# Patient Record
Sex: MALE | Race: BLACK OR AFRICAN AMERICAN | NOT HISPANIC OR LATINO | URBAN - METROPOLITAN AREA
[De-identification: names, ages, dates, MRNs, and addresses within clinical notes are randomized per-mention and may not be internally consistent; named-entity substitution may affect disease eponyms.]

---

## 2023-03-28 ENCOUNTER — HOSPITAL ENCOUNTER (EMERGENCY)
Facility: OTHER | Age: 23
Discharge: HOME | End: 2023-03-28
Attending: EMERGENCY MEDICINE
Payer: MEDICAID

## 2023-03-28 VITALS
HEART RATE: 62 BPM | RESPIRATION RATE: 16 BRPM | OXYGEN SATURATION: 100 % | SYSTOLIC BLOOD PRESSURE: 128 MMHG | DIASTOLIC BLOOD PRESSURE: 68 MMHG | TEMPERATURE: 99 F

## 2023-03-28 DIAGNOSIS — J02.9 VIRAL PHARYNGITIS: Primary | ICD-10-CM

## 2023-03-28 DIAGNOSIS — H10.31 ACUTE CONJUNCTIVITIS OF RIGHT EYE, UNSPECIFIED ACUTE CONJUNCTIVITIS TYPE: ICD-10-CM

## 2023-03-28 LAB — GROUP A STREP, MOLECULAR: NEGATIVE

## 2023-03-28 PROCEDURE — 87651 STREP A DNA AMP PROBE: CPT | Performed by: EMERGENCY MEDICINE

## 2023-03-28 PROCEDURE — 25000003 PHARM REV CODE 250: Performed by: EMERGENCY MEDICINE

## 2023-03-28 PROCEDURE — 99284 EMERGENCY DEPT VISIT MOD MDM: CPT

## 2023-03-28 RX ORDER — PROPARACAINE HYDROCHLORIDE 5 MG/ML
1 SOLUTION/ DROPS OPHTHALMIC
Status: COMPLETED | OUTPATIENT
Start: 2023-03-28 | End: 2023-03-28

## 2023-03-28 RX ORDER — ERYTHROMYCIN 5 MG/G
OINTMENT OPHTHALMIC
Qty: 1 G | Refills: 0 | Status: SHIPPED | OUTPATIENT
Start: 2023-03-28

## 2023-03-28 RX ORDER — IBUPROFEN 600 MG/1
600 TABLET ORAL EVERY 6 HOURS PRN
Qty: 20 TABLET | Refills: 0 | Status: SHIPPED | OUTPATIENT
Start: 2023-03-28

## 2023-03-28 RX ORDER — IBUPROFEN 600 MG/1
600 TABLET ORAL
Status: COMPLETED | OUTPATIENT
Start: 2023-03-28 | End: 2023-03-28

## 2023-03-28 RX ORDER — MAG HYDROX/ALUMINUM HYD/SIMETH 200-200-20
30 SUSPENSION, ORAL (FINAL DOSE FORM) ORAL ONCE
Status: COMPLETED | OUTPATIENT
Start: 2023-03-28 | End: 2023-03-28

## 2023-03-28 RX ORDER — LIDOCAINE HYDROCHLORIDE 20 MG/ML
15 SOLUTION OROPHARYNGEAL ONCE
Status: COMPLETED | OUTPATIENT
Start: 2023-03-28 | End: 2023-03-28

## 2023-03-28 RX ADMIN — PROPARACAINE HYDROCHLORIDE 1 DROP: 5 SOLUTION/ DROPS OPHTHALMIC at 05:03

## 2023-03-28 RX ADMIN — FLUORESCEIN SODIUM 1 EACH: 1 STRIP OPHTHALMIC at 05:03

## 2023-03-28 RX ADMIN — ALUMINUM HYDROXIDE, MAGNESIUM HYDROXIDE, AND SIMETHICONE 30 ML: 200; 200; 20 SUSPENSION ORAL at 05:03

## 2023-03-28 RX ADMIN — IBUPROFEN 600 MG: 600 TABLET, FILM COATED ORAL at 05:03

## 2023-03-28 RX ADMIN — LIDOCAINE HYDROCHLORIDE 15 ML: 20 SOLUTION ORAL; TOPICAL at 05:03

## 2023-03-28 NOTE — ED PROVIDER NOTES
"Encounter Date: 3/28/2023    SCRIBE #1 NOTE: I, Andie Shen, am scribing for, and in the presence of,  Momo Alcaraz MD.     History     Chief Complaint   Patient presents with    Sore Throat    Conjunctivitis     Pt c/o sore throat and "the start of pink eye" x 2 days. CHEL Dennis is a 22 y.o. male, with no pertinent PMHx, who presents to the ED for evaluation of sore throat onset 2-3 days ago. States his pain worsens when eating but denies significant changes in P/O. Endorses associated headache, rhinorrhea, diffuse myalgias, and subjective fever and diaphoresis. He reports right eye redness since earlier today with associated cloudy vision, drainage, and right eye pain. He has not taken any analgesics for his pain. 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 This is the extent of the patient's complaints at this time.    The history is provided by the patient.   Review of patient's allergies indicates:  No Known Allergies  No past medical history on file.  No past surgical history on file.  No family history on file.     Review of Systems   Constitutional:  Positive for diaphoresis and fever.   HENT:  Positive for rhinorrhea and sore throat. Negative for congestion.    Eyes:  Positive for pain, discharge, redness and visual disturbance.   Respiratory:  Negative for shortness of breath.    Cardiovascular:  Negative for chest pain.   Gastrointestinal:  Negative for abdominal pain.   Genitourinary:  Negative for dysuria.   Musculoskeletal:  Positive for myalgias.   Skin:  Negative for rash.   Neurological:  Positive for headaches.   Psychiatric/Behavioral:  Negative for confusion.      Physical Exam     Initial Vitals [03/28/23 0413]   BP Pulse Resp Temp SpO2   126/68 (!) 58 16 98.4 °F (36.9 °C) 100 %      MAP       --         Physical Exam    Nursing note and vitals reviewed.  Constitutional: He appears well-developed and well-nourished. He is not diaphoretic. No " distress.   HENT:   Head: Normocephalic and atraumatic.   Mouth/Throat: No oropharyngeal exudate.   Posterior oropharynx erythema. No peritonsillar abscess.    Eyes: EOM are normal. Pupils are equal, round, and reactive to light. No scleral icterus.   Diffuse right eye conjunctival erythema some crusted discharge.  Fluorescein stain with no areas of uptake   Neck: Neck supple.   Normal range of motion.  Cardiovascular:  Normal rate, regular rhythm, normal heart sounds and intact distal pulses.           No murmur heard.  Pulmonary/Chest: Breath sounds normal. No respiratory distress. He has no wheezes. He has no rhonchi. He has no rales.   Abdominal: Abdomen is soft. There is no abdominal tenderness. There is no rebound and no guarding.   Musculoskeletal:         General: No edema.      Cervical back: Normal range of motion and neck supple.     Lymphadenopathy:     He has no cervical adenopathy.   Neurological: He is alert and oriented to person, place, and time.   Skin: Skin is warm and dry.   Psychiatric: He has a normal mood and affect.       ED Course   Procedures  Labs Reviewed   GROUP A STREP, MOLECULAR          Imaging Results    None          Medications   fluorescein ophthalmic strip 1 each (1 each Both Eyes Given 3/28/23 0513)   proparacaine 0.5 % ophthalmic solution 1 drop (1 drop Both Eyes Given 3/28/23 0513)   aluminum-magnesium hydroxide-simethicone 200-200-20 mg/5 mL suspension 30 mL (30 mLs Oral Given 3/28/23 0512)     And   LIDOcaine HCl 2% oral solution 15 mL (15 mLs Oral Given 3/28/23 0512)   ibuprofen tablet 600 mg (600 mg Oral Given 3/28/23 0512)     Medical Decision Making:   History:   Old Medical Records: I decided to obtain old medical records.  Initial Assessment:       22-year-old male with no comorbidities presents for evaluation of sore throat and right eye redness.  He states onset of sore throat was about 2-3 days ago, reports associated congestion and mild cough as well, and  subjective fevers and myalgias.  No definite known sick contacts.  He has still been able to eat, denies any nausea or diarrhea.  Today with right eye redness that is somewhat painful, also endorses some drainage from it as well, but no blurry vision.  On exam patient with mild posterior pharynx erythema but no exudates or sign of peritonsillar abscess.  No other symptoms or known exposures to suggest mono.  Given other URI symptoms, more likely viral pharyngitis than strep.  He does have diffuse right eye conjunctival erythema, but no purulent drainage.  Fluorescein stain done that shows no uptake, no evidence for corneal abrasion or ulcer.  Strep test negative.  Patient advised on supportive care for suspected viral pharyngitis and viral conjunctivitis, no indication for oral antibiotics at this time, he will continue NSAIDs and topical anesthetics for sore throat, and will give Rx for erythromycin ointment if he has persistent right eye redness or drainage that is more consistent with bacterial conjunctivitis.  He is comfortable with discharge plan and understands return precautions for any worsening symptoms or other concerns      Clinical Tests:   Lab Tests: Ordered and Reviewed        Scribe Attestation:   Scribe #1: I performed the above scribed service and the documentation accurately describes the services I performed. I attest to the accuracy of the note.    I, Dr. Momo Alcaraz, personally performed the services described in this documentation. All medical record entries made by the scribe were at my direction and in my presence.  I have reviewed the chart and agree that the record reflects my personal performance and is accurate and complete. Momo Alcaraz MD.  9:40 PM 03/28/2023                     Clinical Impression:   Final diagnoses:  [J02.9] Viral pharyngitis (Primary)  [H10.31] Acute conjunctivitis of right eye, unspecified acute conjunctivitis type        ED Disposition Condition     Discharge Stable          ED Prescriptions       Medication Sig Dispense Start Date End Date Auth. Provider    ibuprofen (ADVIL,MOTRIN) 600 MG tablet Take 1 tablet (600 mg total) by mouth every 6 (six) hours as needed for Pain. 20 tablet 3/28/2023 -- Momo Alcaraz MD    erythromycin (ROMYCIN) ophthalmic ointment Place a 1/2 inch ribbon of ointment into the right lower eyelid twice a day 1 g 3/28/2023 -- Momo Alcaraz MD          Follow-up Information       Follow up With Specialties Details Why Contact Info    [facility] - Emergency Dept Emergency Medicine Go to  If symptoms worsen [id number] [address] [phone]             Momo Alcaraz MD  03/28/23 [id number]

## 2024-11-27 ENCOUNTER — HOSPITAL ENCOUNTER (EMERGENCY)
Facility: OTHER | Age: 24
Discharge: HOME | End: 2024-11-27
Attending: EMERGENCY MEDICINE
Payer: MEDICAID

## 2024-11-27 VITALS
OXYGEN SATURATION: 100 % | BODY MASS INDEX: 23.74 KG/M2 | WEIGHT: 185 LBS | DIASTOLIC BLOOD PRESSURE: 80 MMHG | TEMPERATURE: 98 F | SYSTOLIC BLOOD PRESSURE: 134 MMHG | HEIGHT: 74 IN | RESPIRATION RATE: 18 BRPM | HEART RATE: 54 BPM

## 2024-11-27 DIAGNOSIS — R07.89 CHEST WALL PAIN: Primary | ICD-10-CM

## 2024-11-27 DIAGNOSIS — R07.9 CHEST PAIN: ICD-10-CM

## 2024-11-27 PROCEDURE — 63600175 PHARM REV CODE 636 W HCPCS

## 2024-11-27 PROCEDURE — 93005 ELECTROCARDIOGRAM TRACING: CPT

## 2024-11-27 PROCEDURE — 25000003 PHARM REV CODE 250

## 2024-11-27 PROCEDURE — 96372 THER/PROPH/DIAG INJ SC/IM: CPT

## 2024-11-27 PROCEDURE — 99284 EMERGENCY DEPT VISIT MOD MDM: CPT | Mod: 25

## 2024-11-27 PROCEDURE — 93010 ELECTROCARDIOGRAM REPORT: CPT | Mod: ,,, | Performed by: INTERNAL MEDICINE

## 2024-11-27 RX ORDER — KETOROLAC TROMETHAMINE 30 MG/ML
30 INJECTION, SOLUTION INTRAMUSCULAR; INTRAVENOUS
Status: COMPLETED | OUTPATIENT
Start: 2024-11-27 | End: 2024-11-27

## 2024-11-27 RX ORDER — METHOCARBAMOL 750 MG/1
750 TABLET, FILM COATED ORAL
Status: COMPLETED | OUTPATIENT
Start: 2024-11-27 | End: 2024-11-27

## 2024-11-27 RX ORDER — IBUPROFEN 800 MG/1
800 TABLET ORAL EVERY 6 HOURS PRN
Qty: 20 TABLET | Refills: 0 | Status: SHIPPED | OUTPATIENT
Start: 2024-11-27 | End: 2024-12-02

## 2024-11-27 RX ORDER — TIZANIDINE 4 MG/1
4 TABLET ORAL EVERY 6 HOURS PRN
Qty: 10 TABLET | Refills: 0 | Status: SHIPPED | OUTPATIENT
Start: 2024-11-27 | End: 2024-12-04

## 2024-11-27 RX ADMIN — KETOROLAC TROMETHAMINE 30 MG: 30 INJECTION, SOLUTION INTRAMUSCULAR; INTRAVENOUS at 10:11

## 2024-11-27 RX ADMIN — METHOCARBAMOL 750 MG: 750 TABLET ORAL at 10:11

## 2024-11-28 NOTE — DISCHARGE INSTRUCTIONS
Take Motrin 800 mg every 6 hours as needed for chest pain for the next 3-4 days. Take tizanidine up to 3 times a day for the next 3-4 days as needed for pain/spasms. Avoid any upper body exercises for the next few days. Follow up with cardiology if symptoms persist or worsen. Referral has been provided.

## 2024-11-28 NOTE — ED PROVIDER NOTES
Encounter Date: 11/27/2024       History     Chief Complaint   Patient presents with    Chest Pain     C/o right sided chest pain that began today. Reports pain intermittent. Pain worse with deep breathing. Denies fever or sob. Reports heavy lifting while working out yesterday.      Asha Dennis is a 24 y.o. healthy male presenting to the emergency department for evaluation of right-sided chest pain that he 1st noticed last night.  Reports that he woke up this morning and the pain worsened in intensity.  Describes it as constant and sharp that is worse with certain movements and with deep inspirations.  Reports that the area is painful to touch.  Reports previous episodes of chest pain for the past month.  Also reports associated intermittent palpitations.  He states that he he did perform bench presses yesterday morning while working out.  He denies fever, cough, shortness of breath, leg swelling, nausea, vomiting, diarrhea, abdominal pain.  He has not taken any medications for his symptoms.  He denies use of alcohol, tobacco, or recreational drugs.  Denies use of hormone therapy.  No recent surgeries.      The history is provided by the patient.     Review of patient's allergies indicates:  No Known Allergies  History reviewed. No pertinent past medical history.  History reviewed. No pertinent surgical history.  No family history on file.  Social History     Tobacco Use    Smoking status: Never   Substance Use Topics    Alcohol use: Never    Drug use: Never     Review of Systems  As per HPI.    Physical Exam     Initial Vitals [11/27/24 2107]   BP Pulse Resp Temp SpO2   139/64 (!) 59 16 98 °F (36.7 °C) 98 %      MAP       --         Physical Exam    Vitals reviewed.  Constitutional: He appears well-developed and well-nourished. No distress.   HENT:   Head: Normocephalic and atraumatic.   Nose: Nose normal. Mouth/Throat: Oropharynx is clear and moist.   Eyes: Conjunctivae and EOM are normal.   Neck: Neck supple.    Normal range of motion.  Cardiovascular:  Normal rate, regular rhythm, normal heart sounds and intact distal pulses.           Pulses:       Radial pulses are 2+ on the right side and 2+ on the left side.   Pulmonary/Chest: Breath sounds normal. No respiratory distress. He has no wheezes. He has no rhonchi. He has no rales. He exhibits tenderness.   Reproducible right-sided chest wall tenderness to palpation.   Musculoskeletal:         General: No edema. Normal range of motion.      Cervical back: Normal range of motion and neck supple.      Comments: No leg edema.     Neurological: He is alert and oriented to person, place, and time. He has normal strength.   Skin: Skin is warm and dry.   Psychiatric: He has a normal mood and affect. His behavior is normal. Judgment and thought content normal.         ED Course   Procedures  Labs Reviewed - No data to display       Imaging Results              X-Ray Chest PA And Lateral (Final result)  Result time 11/27/24 22:03:33      Final result by Chase Givens MD (11/27/24 22:03:33)                   Impression:      No acute cardiopulmonary process identified.      Electronically signed by: Chase Givens MD  Date:    11/27/2024  Time:    22:03               Narrative:    EXAMINATION:  XR CHEST PA AND LATERAL    CLINICAL HISTORY:  Chest pain, unspecified    TECHNIQUE:  PA and lateral views of the chest were performed.    COMPARISON:  None    FINDINGS:  Cardiac silhouette is normal in size.  Lungs are symmetrically expanded.  No evidence of focal consolidative process, pneumothorax, or significant pleural effusion.  No acute osseous abnormality identified.                                       Medications   ketorolac injection 30 mg (30 mg Intramuscular Given 11/27/24 2219)   methocarbamoL tablet 750 mg (750 mg Oral Given 11/27/24 2219)     Medical Decision Making                        Medical Decision Making:   Initial Assessment:   Urgent evaluation of healthy  24-year-old male presenting with right-sided chest pain that he 1st noticed last night.  Reports waking up this morning with worsening, sharp pain.  He denies falls or direct trauma to the chest.  States that he did perform bench presses yesterday morning prior to the onset of pain.  Reports associated palpitations but denies cough, shortness of breath, leg swelling, dizziness, headache, lightheadedness.  He has not taken any medications for his symptoms.  He denies recent surgeries or use of hormone therapy.  On exam, he is well-appearing and nontoxic.  Hemodynamically stable.  Afebrile in the ED. Reproducible right-sided chest wall tenderness to palpation.  Low suspicion for ACS.  Low suspicion for PE.  PERC score of 0.  Plan for EKG and chest x-ray.  Differential Diagnosis:   Differential diagnosis includes but not limited to costochondritis, muscular strain, rib fracture, pneumothorax, pneumonia, PE, ACS.  Clinical Tests:   Radiological Study: Ordered and Reviewed  Medical Tests: Ordered and Reviewed  ED Management:  No acute ischemia on EKG.  No acute process on chest x-ray.  I updated the patient on all results.  Reports improvement of his pain after receiving Toradol and Robaxin.  Explained that I suspect his pain is related to costochondritis versus muscular strain.  Discharged with prescription for ibuprofen and tizanidine.  I advised him to refrain from any upper body exercises for the next few days to allow the area to rest and recover.  Ambulatory referral to Cardiology was provided if he continues to experience persistent or worsening symptoms.  Patient verbalized understanding and agreement with this plan of care. He was given specific return precautions. Advised to follow up with PCP as needed. All questions and concerns addressed. He is stable for discharge.     This note was created with MModal Fluency Direct Dictation. Please excuse any spelling or grammatical errors.             Clinical  Impression:  Final diagnoses:  [R07.9] Chest pain  [R07.89] Chest wall pain (Primary)          ED Disposition Condition    Discharge Stable          ED Prescriptions       Medication Sig Dispense Start Date End Date Auth. Provider    ibuprofen (ADVIL,MOTRIN) 800 MG tablet Take 1 tablet (800 mg total) by mouth every 6 (six) hours as needed for Pain. 20 tablet 11/27/2024 12/2/2024 Gurvinder Knott PA-C    tiZANidine (ZANAFLEX) 4 MG tablet Take 1 tablet (4 mg total) by mouth every 6 (six) hours as needed (pain/spasms). 10 tablet 11/27/2024 12/4/2024 Gurvinder Knott PA-C          Follow-up Information    None          Gurvinder Knott PA-C  11/27/24 [id number]

## 2024-11-28 NOTE — ED TRIAGE NOTES
Asha Dennis, a 24 y.o. male presents to the ED complaining of on/off right sided chest pain x 1 month.    Patient is A&Ox4. Denies any other complaints. ED workup in progress. Safety measures in place; side rails up x2. Call light within pt reach. Plan of care ongoing.    Chief Complaint   Patient presents with    Chest Pain     C/o right sided chest pain that began today. Reports pain intermittent. Pain worse with deep breathing. Denies fever or sob. Reports heavy lifting while working out yesterday.

## 2024-12-02 LAB
OHS QRS DURATION: 100 MS
OHS QTC CALCULATION: 377 MS